# Patient Record
Sex: FEMALE | URBAN - METROPOLITAN AREA
[De-identification: names, ages, dates, MRNs, and addresses within clinical notes are randomized per-mention and may not be internally consistent; named-entity substitution may affect disease eponyms.]

---

## 2017-01-04 ENCOUNTER — APPOINTMENT (OUTPATIENT)
Age: 47
Setting detail: DERMATOLOGY
End: 2017-01-10

## 2017-01-04 DIAGNOSIS — L92.0 GRANULOMA ANNULARE: ICD-10-CM

## 2017-01-04 PROBLEM — D48.5 NEOPLASM OF UNCERTAIN BEHAVIOR OF SKIN: Status: ACTIVE | Noted: 2017-01-04

## 2017-01-04 PROCEDURE — OTHER BIOPSY BY PUNCH METHOD: OTHER

## 2017-01-04 PROCEDURE — 99213 OFFICE O/P EST LOW 20 MIN: CPT | Mod: 25

## 2017-01-04 PROCEDURE — 11100: CPT

## 2017-01-04 PROCEDURE — OTHER COUNSELING: OTHER

## 2017-01-04 ASSESSMENT — LOCATION DETAILED DESCRIPTION DERM: LOCATION DETAILED: RIGHT ELBOW

## 2017-01-04 ASSESSMENT — LOCATION SIMPLE DESCRIPTION DERM: LOCATION SIMPLE: RIGHT ELBOW

## 2017-01-04 ASSESSMENT — LOCATION ZONE DERM: LOCATION ZONE: ARM

## 2017-01-04 NOTE — PROCEDURE: BIOPSY BY PUNCH METHOD
X Depth Of Punch In Cm (Optional): 0
Patient Will Remove Sutures At Home?: No
Biopsy Type: H and E
Detail Level: Detailed
Billing Type: Third-Party Bill
Anesthesia Volume In Cc (Will Not Render If 0): 1
Consent: Written consent was obtained and risks were reviewed including but not limited to scarring, infection, bleeding, scabbing, incomplete removal, nerve damage and allergy to anesthesia.
Notification Instructions: Patient will be notified of biopsy results. However, patient instructed to call the office if not contacted within 2 weeks.
Epidermal Sutures: 5-0 Nylon
Punch Size In Mm: 4
Anesthesia Type: 1% lidocaine without epinephrine
Post-Care Instructions: I reviewed with the patient in detail post-care instructions. Patient is to keep the biopsy site dry overnight, and then apply bacitracin twice daily until healed. Patient may apply hydrogen peroxide soaks to remove any crusting.
Home Suture Removal Text: Patient was provided a home suture removal kit and will remove their sutures at home.  If they have any questions or difficulties they will call the office.
Suture Removal: 14 days
Hemostasis: None
Dressing: bandage
Wound Care: Vaseline

## 2019-12-03 ENCOUNTER — HOSPITAL ENCOUNTER (EMERGENCY)
Age: 49
Discharge: HOME | End: 2019-12-03
Attending: EMERGENCY MEDICINE
Payer: COMMERCIAL

## 2019-12-03 VITALS
TEMPERATURE: 98.9 F | OXYGEN SATURATION: 96 % | DIASTOLIC BLOOD PRESSURE: 75 MMHG | RESPIRATION RATE: 22 BRPM | SYSTOLIC BLOOD PRESSURE: 111 MMHG | HEART RATE: 72 BPM | WEIGHT: 151.9 LBS | BODY MASS INDEX: 25.31 KG/M2 | HEIGHT: 65 IN

## 2019-12-03 DIAGNOSIS — Z71.41 ENCOUNTER FOR ALCOHOL REHABILITATION: ICD-10-CM

## 2019-12-03 DIAGNOSIS — F10.20 ALCOHOLISM, CHRONIC (HCC): Primary | ICD-10-CM

## 2019-12-03 LAB
ALBUMIN SERPL-MCNC: 3.9 G/DL (ref 3.5–5)
ALBUMIN/GLOB SERPL: 1.3 {RATIO} (ref 1.1–2.2)
ALP SERPL-CCNC: 114 U/L (ref 45–117)
ALT SERPL-CCNC: 62 U/L (ref 12–78)
ANION GAP SERPL CALC-SCNC: 1 MMOL/L (ref 5–15)
AST SERPL-CCNC: 44 U/L (ref 15–37)
BASOPHILS # BLD: 0.1 K/UL (ref 0–0.1)
BASOPHILS NFR BLD: 1 % (ref 0–1)
BILIRUB SERPL-MCNC: 0.3 MG/DL (ref 0.2–1)
BUN SERPL-MCNC: 20 MG/DL (ref 6–20)
BUN/CREAT SERPL: 22 (ref 12–20)
CALCIUM SERPL-MCNC: 8.8 MG/DL (ref 8.5–10.1)
CHLORIDE SERPL-SCNC: 108 MMOL/L (ref 97–108)
CO2 SERPL-SCNC: 29 MMOL/L (ref 21–32)
CREAT SERPL-MCNC: 0.89 MG/DL (ref 0.55–1.02)
DIFFERENTIAL METHOD BLD: ABNORMAL
EOSINOPHIL # BLD: 0.2 K/UL (ref 0–0.4)
EOSINOPHIL NFR BLD: 3 % (ref 0–7)
ERYTHROCYTE [DISTWIDTH] IN BLOOD BY AUTOMATED COUNT: 13.5 % (ref 11.5–14.5)
GLOBULIN SER CALC-MCNC: 3 G/DL (ref 2–4)
GLUCOSE SERPL-MCNC: 90 MG/DL (ref 65–100)
HCT VFR BLD AUTO: 38.2 % (ref 35–47)
HGB BLD-MCNC: 12.5 G/DL (ref 11.5–16)
IMM GRANULOCYTES # BLD AUTO: 0 K/UL (ref 0–0.04)
IMM GRANULOCYTES NFR BLD AUTO: 0 % (ref 0–0.5)
INR PPP: 1 (ref 0.9–1.1)
LYMPHOCYTES # BLD: 2.7 K/UL (ref 0.8–3.5)
LYMPHOCYTES NFR BLD: 35 % (ref 12–49)
MAGNESIUM SERPL-MCNC: 1.9 MG/DL (ref 1.6–2.4)
MCH RBC QN AUTO: 34 PG (ref 26–34)
MCHC RBC AUTO-ENTMCNC: 32.7 G/DL (ref 30–36.5)
MCV RBC AUTO: 103.8 FL (ref 80–99)
MONOCYTES # BLD: 0.6 K/UL (ref 0–1)
MONOCYTES NFR BLD: 8 % (ref 5–13)
NEUTS SEG # BLD: 4 K/UL (ref 1.8–8)
NEUTS SEG NFR BLD: 53 % (ref 32–75)
NRBC # BLD: 0 K/UL (ref 0–0.01)
NRBC BLD-RTO: 0 PER 100 WBC
PLATELET # BLD AUTO: 256 K/UL (ref 150–400)
PMV BLD AUTO: 9.3 FL (ref 8.9–12.9)
POTASSIUM SERPL-SCNC: 4.4 MMOL/L (ref 3.5–5.1)
PROT SERPL-MCNC: 6.9 G/DL (ref 6.4–8.2)
PROTHROMBIN TIME: 10.5 SEC (ref 9–11.1)
RBC # BLD AUTO: 3.68 M/UL (ref 3.8–5.2)
SODIUM SERPL-SCNC: 138 MMOL/L (ref 136–145)
WBC # BLD AUTO: 7.6 K/UL (ref 3.6–11)

## 2019-12-03 PROCEDURE — 36415 COLL VENOUS BLD VENIPUNCTURE: CPT

## 2019-12-03 PROCEDURE — 80053 COMPREHEN METABOLIC PANEL: CPT

## 2019-12-03 PROCEDURE — 99284 EMERGENCY DEPT VISIT MOD MDM: CPT

## 2019-12-03 PROCEDURE — 85610 PROTHROMBIN TIME: CPT

## 2019-12-03 PROCEDURE — 83735 ASSAY OF MAGNESIUM: CPT

## 2019-12-03 PROCEDURE — 85025 COMPLETE CBC W/AUTO DIFF WBC: CPT

## 2019-12-03 PROCEDURE — 93005 ELECTROCARDIOGRAM TRACING: CPT

## 2019-12-03 NOTE — LETTER
[address] 
[facility] EMERGENCY DEPT 
[address] [place] [address] [place]by Amin [id number] 
[phone] Work/School Note Date: 12/3/2019 To Whom It May concern: 
 
Sepideh Miner was seen and treated today in the emergency room by the following provider(s): 
Attending Provider: Serena Tate MD. Sepideh Miner is medically cleared for alcohol rehabilitation program.  She had an evaluation today in the emergency department that included a EKG which was normal.  She had a complete blood count, comprehensive metabolic panel, magnesium, and coagulation studies that were all within normal ranges. If you have any further questions, please do not hesitate to contact me at [phone].  
 
Sincerely, 
 
 
 
 
[place] MD

## 2019-12-03 NOTE — ED PROVIDER NOTES
EMERGENCY DEPARTMENT HISTORY AND PHYSICAL EXAM      Date: 12/3/2019  Patient Name: Brooklyn Anguiano    History of Presenting Illness     Chief Complaint   Patient presents with    Alcohol Problem     sent by rehab facility for medical clearance; hx of etoh abuse, benzo's, and reports positive for oxycode but has not used oxycodone       History Provided By: Patient and Patient's     HPI: Brooklyn Anguiano, 47 y.o. female  presents to the ED with cc of need for medical clearance to begin alcohol rehab program.  Patient states she was referred by the [facility] rehab center for medical clearance. She has been trying to enter their program for a history of alcohol abuse. She states she has been drinking since she was 23years of age but it has been increasing amounts. She states she started drinking a lot (2-6 drinks per day) since her parents moved in. She also does take prescribed Xanax. She has not had any alcohol to drink for 3 days. Last took Xanax yesterday. A urine drug screen at the rehab program today was positive for \"oxycodone\". She states the rehab center sent her for medical clearance due to this finding. This is her third ER visit in the past 3 days for medical clearances as she keeps getting sent back. She feels slightly nervous at this time. She has no chest pain or shortness of breath. No vomiting or diarrhea. No jitteriness or shakiness. No headaches. She has not had any alcohol withdrawal symptoms in the past.  No history of alcohol withdrawal seizures or DTs. She denies the use of any opioids specifically oxycodone. There are no other complaints, changes, or physical findings at this time. PCP: Dominguez, MD Geeta    No current facility-administered medications on file prior to encounter. No current outpatient medications on file prior to encounter.        Past History     Past Medical History:  Past Medical History:   Diagnosis Date    Diabetes ([facility] [address]     Hypertension Past Surgical History:  History reviewed. No pertinent surgical history. Family History:  History reviewed. No pertinent family history. Social History:  Social History     Tobacco Use    Smoking status: Never Smoker    Smokeless tobacco: Never Used   Substance Use Topics    Alcohol use: Yes    Drug use: Yes     Types: Benzodiazepines       Allergies:  No Known Allergies      Review of Systems   Review of Systems   Constitutional: Negative for chills and fever. HENT: Negative for congestion, ear pain, rhinorrhea, sore throat and trouble swallowing. Eyes: Negative for visual disturbance. Respiratory: Negative for cough, chest tightness and shortness of breath. Cardiovascular: Negative for chest pain and palpitations. Gastrointestinal: Negative for abdominal pain, blood in stool, constipation, diarrhea, nausea and vomiting. Genitourinary: Negative for decreased urine volume, difficulty urinating, dysuria and frequency. Musculoskeletal: Negative for back pain and neck pain. Skin: Negative for color change and rash. Neurological: Negative for dizziness, weakness, light-headedness and headaches. Psychiatric/Behavioral: The patient is nervous/anxious. Physical Exam   Physical Exam  Vitals signs and nursing note reviewed. Constitutional:       General: She is not in acute distress. Appearance: She is well-developed. She is not ill-appearing. Eyes:      Conjunctiva/sclera: Conjunctivae normal.   Neck:      Musculoskeletal: Neck supple. Cardiovascular:      Rate and Rhythm: Normal rate and regular rhythm. Pulmonary:      Effort: Pulmonary effort is normal. No accessory muscle usage or respiratory distress. Breath sounds: Normal breath sounds. Abdominal:      General: There is no distension. Palpations: Abdomen is soft. Tenderness: There is no tenderness. Lymphadenopathy:      Cervical: No cervical adenopathy.    Skin:     General: Skin is warm and dry.   Neurological:      Mental Status: She is alert and oriented to person, place, and time. Cranial Nerves: No cranial nerve deficit. Sensory: No sensory deficit. Diagnostic Study Results     Labs -     Recent Results (from the past 24 hour(s))   EKG, 12 LEAD, INITIAL    Collection Time: 12/03/19 11:54 AM   Result Value Ref Range    Ventricular Rate 57 BPM    Atrial Rate 57 BPM    P-R Interval 148 ms    QRS Duration 100 ms    Q-T Interval 410 ms    QTC Calculation (Bezet) 399 ms    Calculated P Axis 34 degrees    Calculated R Axis 26 degrees    Calculated T Axis 46 degrees    Diagnosis Sinus bradycardia  No previous ECGs available      CBC WITH AUTOMATED DIFF    Collection Time: 12/03/19 11:56 AM   Result Value Ref Range    WBC 7.6 3.6 - 11.0 K/uL    RBC 3.68 (L) 3.80 - 5.20 M/uL    HGB 12.5 11.5 - 16.0 g/dL    HCT 38.2 35.0 - 47.0 %    .8 (H) 80.0 - 99.0 FL    MCH 34.0 26.0 - 34.0 PG    MCHC 32.7 30.0 - 36.5 g/dL    RDW 13.5 11.5 - 14.5 %    PLATELET 998 761 - 212 K/uL    MPV 9.3 8.9 - 12.9 FL    NRBC 0.0 0  WBC    ABSOLUTE NRBC 0.00 0.00 - 0.01 K/uL    NEUTROPHILS 53 32 - 75 %    LYMPHOCYTES 35 12 - 49 %    MONOCYTES 8 5 - 13 %    EOSINOPHILS 3 0 - 7 %    BASOPHILS 1 0 - 1 %    IMMATURE GRANULOCYTES 0 0.0 - 0.5 %    ABS. NEUTROPHILS 4.0 1.8 - 8.0 K/UL    ABS. LYMPHOCYTES 2.7 0.8 - 3.5 K/UL    ABS. MONOCYTES 0.6 0.0 - 1.0 K/UL    ABS. EOSINOPHILS 0.2 0.0 - 0.4 K/UL    ABS. BASOPHILS 0.1 0.0 - 0.1 K/UL    ABS. IMM.  GRANS. 0.0 0.00 - 0.04 K/UL    DF AUTOMATED     METABOLIC PANEL, COMPREHENSIVE    Collection Time: 12/03/19 11:56 AM   Result Value Ref Range    Sodium 138 136 - 145 mmol/L    Potassium 4.4 3.5 - 5.1 mmol/L    Chloride 108 97 - 108 mmol/L    CO2 29 21 - 32 mmol/L    Anion gap 1 (L) 5 - 15 mmol/L    Glucose 90 65 - 100 mg/dL    BUN 20 6 - 20 MG/DL    Creatinine 0.89 0.55 - 1.02 MG/DL    BUN/Creatinine ratio 22 (H) 12 - 20      GFR est AA >60 >60 ml/min/1.73m2    GFR est non-AA >60 >60 ml/min/1.73m2    Calcium 8.8 8.5 - 10.1 MG/DL    Bilirubin, total 0.3 0.2 - 1.0 MG/DL    ALT (SGPT) 62 12 - 78 U/L    AST (SGOT) 44 (H) 15 - 37 U/L    Alk. phosphatase 114 45 - 117 U/L    Protein, total 6.9 6.4 - 8.2 g/dL    Albumin 3.9 3.5 - 5.0 g/dL    Globulin 3.0 2.0 - 4.0 g/dL    A-G Ratio 1.3 1.1 - 2.2     PROTHROMBIN TIME + INR    Collection Time: 12/03/19 11:56 AM   Result Value Ref Range    INR 1.0 0.9 - 1.1      Prothrombin time 10.5 9.0 - 11.1 sec   MAGNESIUM    Collection Time: 12/03/19 11:56 AM   Result Value Ref Range    Magnesium 1.9 1.6 - 2.4 mg/dL       Radiologic Studies -   No orders to display     CT Results  (Last 48 hours)    None        CXR Results  (Last 48 hours)    None            Medical Decision Making   I am the first provider for this patient. I reviewed the vital signs, available nursing notes, past medical history, past surgical history, family history and social history. Vital Signs-Reviewed the patient's vital signs. Patient Vitals for the past 24 hrs:   Temp Pulse Resp BP SpO2   12/03/19 1300 -- 72 22 111/75 96 %   12/03/19 1200 -- 64 22 120/70 98 %   12/03/19 1125 98.9 °F (37.2 °C) 64 18 (!) 138/100 100 %         Records Reviewed: Nursing Notes and Old Medical Records    Provider Notes (Medical Decision Making):   Patient presents for medical clearance to start alcohol rehabilitation program.  She was sent for medical clearance specifically because she had a positive drug screen for \"oxycodone\". I am not aware of any drug test that test specifically for oxycodone and we do not have a test available to us. Patient adamantly denies she has not taken any opioids. Regardless if she was this would not change any plans for rehabilitation. Does not pose any additional concerns for complications. Likely a cross contaminant from a prescribed medication or her benzodiazepines. Drug screens will test for opioids as a entire class.   This can commonly give a false positive with medication such as benzodiazepines. I can only assume that they had a positive opioid test.  She otherwise has normal labs and EKG. Is medically cleared for rehabilitation. Today she does not appear clinically intoxicated. She also does not appear to be in alcohol withdrawal.    ED Course:   Initial assessment performed. The patients presenting problems have been discussed, and they are in agreement with the care plan formulated and outlined with them. I have encouraged them to ask questions as they arise throughout their visit. Orders Placed This Encounter    CBC WITH AUTOMATED DIFF    METABOLIC PANEL, COMPREHENSIVE    PROTHROMBIN TIME + INR    MAGNESIUM    EKG NOTEWRITER(ASAP ONLY)    EKG, 12 LEAD, INITIAL       EKG  Date/Time: 12/3/2019 11:54 AM  Performed by: Kaitlin Nova MD  Authorized by: Kaitlin Nova MD     Rate:     ECG rate:  57    ECG rate assessment: bradycardic    Rhythm:     Rhythm: sinus bradycardia    Ectopy:     Ectopy: none    QRS:     QRS axis:  Normal    QRS intervals:  Normal  Conduction:     Conduction: normal    ST segments:     ST segments:  Normal  T waves:     T waves: normal            Critical Care Time:   0    Disposition:  Discharge  1:30 PM    The patient's emergency department evaluation is now complete. I have reviewed all labs, imaging, and pertinent information. I have discussed all results with the patient and/or family. Based on our evaluation today I do believe that the patient is safe to be discharged home. The patient has been provided with at home instructions that are pertinent to their complaint today, although these may not be specific to the exact diagnosis. I have reviewed the patient's home medications and attempted to reconcile if not already done so by pharmacy or nursing staff. I have discussed all new prescriptions with the patient.   The patient has been encouraged to follow-up with primary care doctor and/or specialist, and these have been discussed with the patient. The patient has been advised that they may return to the emergency department if they have any worsening symptoms and or new symptoms that are of concern to them. Verbal discharge instructions may have also been provided to the patient that may not be specifically contained in the written discharge instructions. The patient has been given opportunity to ask questions prior to discharge. PLAN:  1. There are no discharge medications for this patient. 2.   Follow-up Information     Follow up With Specialties Details Why Contact Info    [facility] EMERGENCY DEPT Emergency Medicine Go to  If symptoms worsen [address] [place] [address]  [phone]        Return to ED if worse     Diagnosis     Clinical Impression:   1. Alcoholism, chronic ([facility] [address]    2. Encounter for alcohol rehabilitation            This note will not be viewable in [address]

## 2019-12-03 NOTE — ED TRIAGE NOTES
Pt to ED stating she wants to be medically cleared for rehab for oxycodone and alcohol. Last drink was Saturday, last pill was Sunday. Pt denies any hx of withdrawal. Pt arrives A&Ox4 RR e/u. Skin pwd.

## 2019-12-04 LAB
ATRIAL RATE: 57 BPM
CALCULATED P AXIS, ECG09: 34 DEGREES
CALCULATED R AXIS, ECG10: 26 DEGREES
CALCULATED T AXIS, ECG11: 46 DEGREES
DIAGNOSIS, 93000: NORMAL
P-R INTERVAL, ECG05: 148 MS
Q-T INTERVAL, ECG07: 410 MS
QRS DURATION, ECG06: 100 MS
QTC CALCULATION (BEZET), ECG08: 399 MS
VENTRICULAR RATE, ECG03: 57 BPM

## 2023-07-31 ENCOUNTER — APPOINTMENT (RX ONLY)
Dept: URBAN - METROPOLITAN AREA CLINIC 33 | Facility: CLINIC | Age: 53
Setting detail: DERMATOLOGY
End: 2023-07-31

## 2023-07-31 DIAGNOSIS — L23.9 ALLERGIC CONTACT DERMATITIS, UNSPECIFIED CAUSE: ICD-10-CM

## 2023-07-31 PROCEDURE — ? PRESCRIPTION

## 2023-07-31 PROCEDURE — ? PRESCRIPTION MEDICATION MANAGEMENT

## 2023-07-31 PROCEDURE — 99203 OFFICE O/P NEW LOW 30 MIN: CPT

## 2023-07-31 PROCEDURE — ? COUNSELING

## 2023-07-31 RX ORDER — CLOBETASOL PROPIONATE 0.5 MG/G
CREAM TOPICAL BID
Qty: 60 | Refills: 1 | Status: ERX | COMMUNITY
Start: 2023-07-31

## 2023-07-31 RX ORDER — MUPIROCIN 20 MG/G
OINTMENT TOPICAL
Qty: 15 | Refills: 1 | Status: ERX | COMMUNITY
Start: 2023-07-31

## 2023-07-31 RX ADMIN — MUPIROCIN: 20 OINTMENT TOPICAL at 00:00

## 2023-07-31 RX ADMIN — CLOBETASOL PROPIONATE: 0.5 CREAM TOPICAL at 00:00

## 2023-07-31 ASSESSMENT — LOCATION DETAILED DESCRIPTION DERM: LOCATION DETAILED: LEFT DORSAL FOOT

## 2023-07-31 ASSESSMENT — LOCATION SIMPLE DESCRIPTION DERM: LOCATION SIMPLE: LEFT FOOT

## 2023-07-31 ASSESSMENT — LOCATION ZONE DERM: LOCATION ZONE: FEET

## 2023-07-31 NOTE — PROCEDURE: PRESCRIPTION MEDICATION MANAGEMENT
Render In Strict Bullet Format?: No
Plan: clobetasol 0.05 % topical cream BID; Apply twice daily to rash up to 2 weeks/month as needed.\\n\\nmupirocin 2 % topical ointment; Apply to affected areas on foot BID
Detail Level: Zone

## 2023-10-16 ENCOUNTER — RX ONLY (OUTPATIENT)
Age: 53
Setting detail: RX ONLY
End: 2023-10-16

## 2023-10-16 RX ORDER — CLOBETASOL PROPIONATE 0.5 MG/G
CREAM TOPICAL BID
Qty: 60 | Refills: 0 | Status: ERX